# Patient Record
Sex: FEMALE | Race: WHITE | NOT HISPANIC OR LATINO | Employment: OTHER | URBAN - METROPOLITAN AREA
[De-identification: names, ages, dates, MRNs, and addresses within clinical notes are randomized per-mention and may not be internally consistent; named-entity substitution may affect disease eponyms.]

---

## 2017-02-02 ENCOUNTER — GENERIC CONVERSION - ENCOUNTER (OUTPATIENT)
Dept: OTHER | Facility: OTHER | Age: 67
End: 2017-02-02

## 2017-07-20 ENCOUNTER — ALLSCRIPTS OFFICE VISIT (OUTPATIENT)
Dept: OTHER | Facility: OTHER | Age: 67
End: 2017-07-20

## 2017-11-13 ENCOUNTER — HOSPITAL ENCOUNTER (EMERGENCY)
Facility: HOSPITAL | Age: 67
Discharge: HOME | End: 2017-11-13
Attending: EMERGENCY MEDICINE | Admitting: EMERGENCY MEDICINE
Payer: MEDICARE

## 2017-11-13 VITALS
SYSTOLIC BLOOD PRESSURE: 142 MMHG | RESPIRATION RATE: 18 BRPM | OXYGEN SATURATION: 99 % | DIASTOLIC BLOOD PRESSURE: 82 MMHG | HEART RATE: 90 BPM | HEIGHT: 64 IN | BODY MASS INDEX: 34.83 KG/M2 | WEIGHT: 204 LBS | TEMPERATURE: 98.8 F

## 2017-11-13 DIAGNOSIS — N39.0 UTI (URINARY TRACT INFECTION): Primary | ICD-10-CM

## 2017-11-13 LAB
BACTERIA UR QL AUTO: ABNORMAL /HPF
BILIRUB UR QL STRIP: NEGATIVE
CLARITY UR: ABNORMAL
COLOR UR: YELLOW
GLUCOSE SERPL-MCNC: 113 MG/DL (ref 65–140)
GLUCOSE UR STRIP-MCNC: NEGATIVE MG/DL
HGB UR QL STRIP.AUTO: ABNORMAL
KETONES UR STRIP-MCNC: NEGATIVE MG/DL
LEUKOCYTE ESTERASE UR QL STRIP: ABNORMAL
NITRITE UR QL STRIP: NEGATIVE
NON-SQ EPI CELLS URNS QL MICRO: ABNORMAL /HPF
PH UR STRIP.AUTO: 7 [PH] (ref 4.5–8)
PROT UR STRIP-MCNC: NEGATIVE MG/DL
RBC #/AREA URNS AUTO: ABNORMAL /HPF
SP GR UR STRIP.AUTO: 1.01 (ref 1–1.03)
UROBILINOGEN UR QL STRIP.AUTO: 0.2 E.U./DL
WBC #/AREA URNS AUTO: ABNORMAL /HPF

## 2017-11-13 PROCEDURE — 87077 CULTURE AEROBIC IDENTIFY: CPT | Performed by: EMERGENCY MEDICINE

## 2017-11-13 PROCEDURE — 99283 EMERGENCY DEPT VISIT LOW MDM: CPT

## 2017-11-13 PROCEDURE — 82948 REAGENT STRIP/BLOOD GLUCOSE: CPT

## 2017-11-13 PROCEDURE — 81001 URINALYSIS AUTO W/SCOPE: CPT | Performed by: EMERGENCY MEDICINE

## 2017-11-13 PROCEDURE — 87086 URINE CULTURE/COLONY COUNT: CPT | Performed by: EMERGENCY MEDICINE

## 2017-11-13 PROCEDURE — 51798 US URINE CAPACITY MEASURE: CPT

## 2017-11-13 PROCEDURE — 87186 SC STD MICRODIL/AGAR DIL: CPT | Performed by: EMERGENCY MEDICINE

## 2017-11-13 RX ORDER — PHENAZOPYRIDINE HYDROCHLORIDE 200 MG/1
TABLET, FILM COATED ORAL
Qty: 9 TABLET | Refills: 0 | Status: SHIPPED | OUTPATIENT
Start: 2017-11-13

## 2017-11-13 RX ORDER — LOSARTAN POTASSIUM 100 MG/1
TABLET ORAL
COMMUNITY

## 2017-11-13 RX ORDER — DULOXETIN HYDROCHLORIDE 60 MG/1
CAPSULE, DELAYED RELEASE ORAL
COMMUNITY

## 2017-11-13 RX ORDER — MODAFINIL 200 MG/1
TABLET ORAL
COMMUNITY

## 2017-11-13 RX ORDER — CIPROFLOXACIN 500 MG/1
500 TABLET, FILM COATED ORAL ONCE
Status: COMPLETED | OUTPATIENT
Start: 2017-11-13 | End: 2017-11-13

## 2017-11-13 RX ORDER — AZELASTINE HYDROCHLORIDE, FLUTICASONE PROPIONATE 137; 50 UG/1; UG/1
SPRAY, METERED NASAL
COMMUNITY
End: 2019-12-23 | Stop reason: SDUPTHER

## 2017-11-13 RX ORDER — MELOXICAM 7.5 MG/1
TABLET ORAL
COMMUNITY

## 2017-11-13 RX ORDER — CIPROFLOXACIN 500 MG/1
500 TABLET, FILM COATED ORAL 2 TIMES DAILY
Qty: 20 TABLET | Refills: 0 | Status: SHIPPED | OUTPATIENT
Start: 2017-11-13 | End: 2017-11-23

## 2017-11-13 RX ORDER — DIPHENHYDRAMINE HCL 25 MG
CAPSULE ORAL
COMMUNITY

## 2017-11-13 RX ORDER — NYSTATIN 100000 U/G
CREAM TOPICAL
COMMUNITY

## 2017-11-13 RX ORDER — LEVOTHYROXINE SODIUM 0.05 MG/1
TABLET ORAL
COMMUNITY

## 2017-11-13 RX ORDER — ALPRAZOLAM 0.25 MG/1
TABLET ORAL
COMMUNITY
End: 2018-02-01 | Stop reason: SDUPTHER

## 2017-11-13 RX ORDER — PHENAZOPYRIDINE HYDROCHLORIDE 100 MG/1
100 TABLET, FILM COATED ORAL ONCE
Status: COMPLETED | OUTPATIENT
Start: 2017-11-13 | End: 2017-11-13

## 2017-11-13 RX ORDER — FEXOFENADINE HCL 180 MG/1
TABLET ORAL
COMMUNITY

## 2017-11-13 RX ORDER — OMEPRAZOLE 10 MG/1
20 CAPSULE, DELAYED RELEASE ORAL DAILY
COMMUNITY
End: 2019-11-12

## 2017-11-13 RX ADMIN — PHENAZOPYRIDINE HYDROCHLORIDE 100 MG: 100 TABLET ORAL at 18:10

## 2017-11-13 RX ADMIN — CIPROFLOXACIN 500 MG: 500 TABLET, FILM COATED ORAL at 18:36

## 2017-11-13 NOTE — DISCHARGE INSTRUCTIONS
Urinary Tract Infection in Women, Ambulatory Care   GENERAL INFORMATION:   A urinary tract infection (UTI)  is caused by bacteria that get inside your urinary tract  Most bacteria that enter your urinary tract are expelled when you urinate  If the bacteria stay in your urinary tract, you may get an infection  Your urinary tract includes your kidneys, ureters, bladder, and urethra  Urine is made in your kidneys, and it flows from the ureters to the bladder  Urine leaves the bladder through the urethra  A UTI is more common in your lower urinary tract, which includes your bladder and urethra  Common symptoms include the following:   · Urinating more often or waking from sleep to urinate    · Pain or burning when you urinate    · Pain or pressure in your lower abdomen     · Urine that smells bad    · Blood in your urine    · Leaking urine  Seek immediate care for the following symptoms:   · Urinating very little or not at all    · Vomiting    · Shaking chills with a fever    · Side or back pain that gets worse  Treatment for a UTI  may include medicines to treat a bacterial infection  You may also need medicines to decrease pain and burning, or decrease the urge to urinate often  Prevent a UTI:   · Urinate when you feel the urge  Do not hold your urine  Urinate as soon as you feel you have to  · Drink liquids as directed  Ask how much liquid to drink each day and which liquids are best for you  You may need to drink more fluids than usual to help flush out the bacteria  Do not drink alcohol, caffeine, and citrus juices  These can irritate your bladder and increase your symptoms  · Apply heat  on your abdomen for 20 to 30 minutes every 2 hours for as many days as directed  Heat helps decrease discomfort and pressure in your bladder  Follow up with your healthcare provider as directed:  Write down your questions so you remember to ask them during your visits     CARE AGREEMENT:   You have the right to help plan your care  Learn about your health condition and how it may be treated  Discuss treatment options with your caregivers to decide what care you want to receive  You always have the right to refuse treatment  The above information is an  only  It is not intended as medical advice for individual conditions or treatments  Talk to your doctor, nurse or pharmacist before following any medical regimen to see if it is safe and effective for you  © 2014 [address] is for End User's use only and may not be sold, redistributed or otherwise used for commercial purposes  All illustrations and images included in CareNotes® are the copyrighted property of [employer]  or Laci Bowen

## 2017-11-13 NOTE — ED PROVIDER NOTES
History  Chief Complaint   Patient presents with    Possible UTI     Pt states she has been having increased frequency with urination for the past week  Pt states it feels like she is not fully emptying her bladder  Pt also complains of burning in perineum but denies any vaginal discharge  HPI  58-year-old white female with a chief complaint of having increased frequency on urination over the past week  Patient states that she has to go frequently to the bathroom and it feels like she does not fully empty her bladder  Patient denies any burning or pain on urination  Patient denies any fever or chills  Patient does take Benadryl and also Mucinex for seasonal allergies  Patient states she had a similar problem list summer was placed on medications and felt better  Patient states she has had a complete hysterectomy including her bilateral ovaries  Patient denies any nausea or vomiting states that she is eating and drinking well  Prior to Admission Medications   Prescriptions Last Dose Informant Patient Reported? Taking?    ALPRAZolam (XANAX) 0 25 mg tablet   Yes No   Sig: Take by mouth   Azelastine-Fluticasone (DYMISTA) 137-50 MCG/ACT SUSP   Yes No   Sig: into each nostril   DULoxetine (CYMBALTA) 60 mg delayed release capsule   Yes No   Sig: Take by mouth   aspirin 81 MG tablet   Yes No   Sig: Take by mouth   diphenhydrAMINE (BENADRYL) 25 mg capsule   Yes No   Sig: Take by mouth   fexofenadine (MUCINEX ALLERGY) 180 MG tablet   Yes No   Sig: Take by mouth   levothyroxine 50 mcg tablet   Yes No   Sig: Take by mouth   losartan (COZAAR) 100 MG tablet   Yes No   Sig: Take by mouth   meloxicam (MOBIC) 7 5 mg tablet   Yes No   Sig: Take by mouth   modafinil (PROVIGIL) 200 MG tablet   Yes No   Sig: Take by mouth   nystatin (MYCOSTATIN) cream   Yes No   Sig: Nystatin [id number] UNIT/GM External Cream  APPLY AS NEEDED TO VAGINAL AREA   Refills: 0    Active   omeprazole (PriLOSEC) 10 mg delayed release capsule   Yes Yes   Sig: Take 20 mg by mouth daily      Facility-Administered Medications: None       Past Medical History:   Diagnosis Date    Anxiety     Arthritis     Depression     Disease of thyroid gland     GERD (gastroesophageal reflux disease)     Hypertension     Sleep apnea        Past Surgical History:   Procedure Laterality Date    HYSTERECTOMY         History reviewed  No pertinent family history  I have reviewed and agree with the history as documented  Social History   Substance Use Topics    Smoking status: Never Smoker    Smokeless tobacco: Never Used    Alcohol use No        Review of Systems   Constitutional: Negative for chills and fever  HENT: Negative for congestion and rhinorrhea  Eyes: Negative for discharge and visual disturbance  Respiratory: Negative for shortness of breath and wheezing  Cardiovascular: Negative for chest pain and palpitations  Gastrointestinal: Positive for abdominal pain  Negative for nausea and vomiting  Endocrine: Negative for polydipsia and polyuria  Genitourinary: Positive for difficulty urinating, dysuria, frequency and urgency  Negative for hematuria  Musculoskeletal: Negative for arthralgias, gait problem and neck stiffness  Skin: Negative for rash and wound  Neurological: Negative for dizziness and headaches  Psychiatric/Behavioral: Negative for confusion and suicidal ideas  Physical Exam  ED Triage Vitals [11/13/17 1646]   Temperature Pulse Respirations Blood Pressure SpO2   98 8 °F (37 1 °C) (!) 114 20 146/100 96 %      Temp Source Heart Rate Source Patient Position - Orthostatic VS BP Location FiO2 (%)   Oral Monitor Sitting Left arm --      Pain Score       3           Orthostatic Vital Signs  Vitals:    11/13/17 1646 11/13/17 1837   BP: 146/100 142/82   Pulse: (!) 114 90   Patient Position - Orthostatic VS: Sitting        Physical Exam   Constitutional: She is oriented to person, place, and time   She appears well-developed and well-nourished  76-year-old white female in no acute distress but appears dehydrated to me  HENT:   Head: Normocephalic and atraumatic  Positive dry mouth   Eyes: EOM are normal  Pupils are equal, round, and reactive to light  Neck: Normal range of motion  Neck supple  Cardiovascular: Regular rhythm and normal heart sounds  Exam reveals no friction rub  No murmur heard  Positive tachycardia   Pulmonary/Chest: Effort normal and breath sounds normal  No respiratory distress  She has no wheezes  She has no rales  Abdominal: Soft  Bowel sounds are normal  She exhibits no distension  There is no tenderness  There is no rebound and no guarding  Musculoskeletal: Normal range of motion  She exhibits no edema, tenderness or deformity  Neurological: She is alert and oriented to person, place, and time  No cranial nerve deficit  She exhibits normal muscle tone  Coordination normal    Skin: Skin is warm and dry  No erythema  No pallor  Psychiatric: She has a normal mood and affect  Nursing note and vitals reviewed  ED Medications  Medications   phenazopyridine (PYRIDIUM) tablet 100 mg (100 mg Oral Given 11/13/17 1810)   ciprofloxacin (CIPRO) tablet 500 mg (500 mg Oral Given 11/13/17 1836)       Diagnostic Studies  Results Reviewed     Procedure Component Value Units Date/Time    Urine Microscopic [[id number]]  (Abnormal) Collected:  11/13/17 1803    Lab Status:  Final result Specimen:  Urine from Urine, Clean Catch Updated:  11/13/17 1843     RBC, UA 2-4 (A) /hpf      WBC, UA Innumerable (A) /hpf      Epithelial Cells Occasional /hpf      Bacteria, UA Moderate (A) /hpf     Urine culture [[id number]] Collected:  11/13/17 1803    Lab Status:   In process Specimen:  Urine from Urine, Clean Catch Updated:  11/13/17 1843    UA w Reflex to Microscopic w Reflex to Culture [[id number]]  (Abnormal) Collected:  11/13/17 1803    Lab Status:  Final result Specimen:  Urine from Urine, Clean Catch Updated:  11/13/17 1814     Color, UA Yellow     Clarity, UA Cloudy     Specific Gravity, UA 1 015     pH, UA 7 0     Leukocytes, UA Large (A)     Nitrite, UA Negative     Protein, UA Negative mg/dl      Glucose, UA Negative mg/dl      Ketones, UA Negative mg/dl      Urobilinogen, UA 0 2 E U /dl      Bilirubin, UA Negative     Blood, UA Trace-Intact (A)    Fingerstick Glucose (POCT) [[id number]]  (Normal) Collected:  11/13/17 1809    Lab Status:  Final result Updated:  11/13/17 1810     POC Glucose 113 mg/dl                  No orders to display              Procedures  Procedures       Phone Contacts  ED Phone Contact    ED Course  ED Course         spoke with patient about the use of antihistamines in urinary retention  Patient is taking Benadryl and Mucinex and also a nasal spray  However I went over her urinalysis the her and it showed some large amount of leukocytes so I believe that this dysuria is mostly caused by a UTI  Patient was placed on Cipro and also pyridium  Bladder scan showed 50 cc of urine                        MDM  CritCare Time    Differential diagnosis includes:  1  Dysuria  2  Polyuria  3  UTI  4  Rule out kidney disease  5  Hyperglycemia  Disposition  Final diagnoses:   UTI (urinary tract infection)     Time reflects when diagnosis was documented in both MDM as applicable and the Disposition within this note     Time User Action Codes Description Comment    11/13/2017  6:30 PM Manisha Griffin Add [N39 0] UTI (urinary tract infection)       ED Disposition     ED Disposition Condition Comment    Discharge  Brianafurt discharge to home/self care      Condition at discharge: Good        Follow-up Information     Follow up With Specialties Details Why [address] Dunn MD Gynecologic Oncology In 1 week  [place] [id number]      Donavon Bonilla MD Urology In 1 week  [address] [place] [address]  [phone] Patient's Medications   Discharge Prescriptions    CIPROFLOXACIN (CIPRO) 500 MG TABLET    Take 1 tablet by mouth 2 (two) times a day for 10 days       Start Date: 11/13/2017End Date: 11/23/2017       Order Dose: 500 mg       Quantity: 20 tablet    Refills: 0    PHENAZOPYRIDINE (PYRIDIUM) 200 MG TABLET    Take one pill 3 x/ day x 2-3 days       Start Date: 11/13/2017End Date: --       Order Dose: --       Quantity: 9 tablet    Refills: 0     No discharge procedures on file      ED Provider  Electronically Signed by           Phani Dunlap DO  11/13/17 5066

## 2017-11-15 LAB — BACTERIA UR CULT: ABNORMAL

## 2017-11-21 ENCOUNTER — HOSPITAL ENCOUNTER (OUTPATIENT)
Dept: MAMMOGRAPHY | Facility: CLINIC | Age: 67
Discharge: HOME | End: 2017-11-21
Payer: MEDICARE

## 2017-11-21 ENCOUNTER — TRANSCRIBE ORDERS (OUTPATIENT)
Dept: LAB | Facility: CLINIC | Age: 67
End: 2017-11-21

## 2017-11-21 DIAGNOSIS — Z12.31 ENCOUNTER FOR SCREENING MAMMOGRAM FOR HIGH-RISK PATIENT: ICD-10-CM

## 2017-11-21 DIAGNOSIS — Z12.39 SCREENING BREAST EXAMINATION: Primary | ICD-10-CM

## 2017-11-21 PROCEDURE — 77063 BREAST TOMOSYNTHESIS BI: CPT

## 2017-11-21 PROCEDURE — G0202 SCR MAMMO BI INCL CAD: HCPCS

## 2018-01-13 VITALS
HEIGHT: 63 IN | WEIGHT: 211.31 LBS | HEART RATE: 113 BPM | TEMPERATURE: 98.1 F | SYSTOLIC BLOOD PRESSURE: 144 MMHG | DIASTOLIC BLOOD PRESSURE: 84 MMHG | RESPIRATION RATE: 18 BRPM | BODY MASS INDEX: 37.44 KG/M2

## 2018-02-01 ENCOUNTER — OFFICE VISIT (OUTPATIENT)
Dept: OBGYN CLINIC | Facility: MEDICAL CENTER | Age: 68
End: 2018-02-01
Payer: MEDICARE

## 2018-02-01 VITALS
WEIGHT: 216 LBS | HEIGHT: 64 IN | SYSTOLIC BLOOD PRESSURE: 126 MMHG | BODY MASS INDEX: 36.88 KG/M2 | DIASTOLIC BLOOD PRESSURE: 76 MMHG

## 2018-02-01 DIAGNOSIS — Z12.31 ENCOUNTER FOR SCREENING MAMMOGRAM FOR MALIGNANT NEOPLASM OF BREAST: ICD-10-CM

## 2018-02-01 DIAGNOSIS — Z78.0 POSTMENOPAUSAL: ICD-10-CM

## 2018-02-01 DIAGNOSIS — Z91.89 GYN EXAM FOR HIGH-RISK MEDICARE PATIENT: Primary | ICD-10-CM

## 2018-02-01 PROBLEM — J30.2 SEASONAL ALLERGIES: Status: ACTIVE | Noted: 2018-02-01

## 2018-02-01 PROBLEM — M16.11 OSTEOARTHRITIS OF RIGHT HIP: Status: ACTIVE | Noted: 2017-02-28

## 2018-02-01 PROBLEM — F41.9 ANXIETY: Status: ACTIVE | Noted: 2017-07-20

## 2018-02-01 PROBLEM — I10 HYPERTENSION: Status: ACTIVE | Noted: 2017-07-20

## 2018-02-01 PROBLEM — M19.90 ARTHRITIS: Status: ACTIVE | Noted: 2017-07-20

## 2018-02-01 PROBLEM — G47.30 SLEEP APNEA: Status: ACTIVE | Noted: 2017-07-20

## 2018-02-01 PROBLEM — E07.9 DISEASE OF THYROID GLAND: Status: ACTIVE | Noted: 2018-02-01

## 2018-02-01 PROCEDURE — G0101 CA SCREEN;PELVIC/BREAST EXAM: HCPCS | Performed by: OBSTETRICS & GYNECOLOGY

## 2018-02-01 RX ORDER — MELATONIN 10 MG
1 CAPSULE ORAL
COMMUNITY

## 2018-02-01 RX ORDER — LANOLIN ALCOHOL/MO/W.PET/CERES
CREAM (GRAM) TOPICAL
COMMUNITY

## 2018-02-01 RX ORDER — AMPICILLIN TRIHYDRATE 250 MG
CAPSULE ORAL
COMMUNITY

## 2018-02-01 RX ORDER — UBIDECARENONE 50 MG
1200 CAPSULE ORAL
COMMUNITY

## 2018-02-01 RX ORDER — CLOBETASOL PROPIONATE 0.5 MG/G
OINTMENT TOPICAL
COMMUNITY
Start: 2018-01-09

## 2018-02-01 RX ORDER — ALPRAZOLAM 0.25 MG/1
0.25 TABLET ORAL
COMMUNITY

## 2018-02-01 RX ORDER — NIACINAMIDE 500 MG
500 TABLET ORAL
COMMUNITY

## 2018-02-01 RX ORDER — MULTIVITAMIN
1 TABLET ORAL DAILY
COMMUNITY

## 2018-02-01 RX ORDER — AMOXICILLIN 500 MG
CAPSULE ORAL
COMMUNITY

## 2018-02-01 RX ORDER — SOY ISOFLAVONE 40 MG
TABLET ORAL
COMMUNITY

## 2018-02-01 RX ORDER — TRAMADOL HYDROCHLORIDE 50 MG/1
50-100 TABLET ORAL EVERY 6 HOURS
COMMUNITY
Start: 2017-03-01

## 2018-02-01 NOTE — PROGRESS NOTES
Saurabh Morton is a 75 y o  female who presents for annual exam  The patient has no complaints today  The patient is not sexually active  GYN screening history: last mammogram: was normal  The patient is not taking hormone replacement therapy  Patient denies post-menopausal vaginal bleeding    The patient wears seatbelts: yes  The patient participates in regular exercise: yes  Has the patient ever been transfused or tattooed?: no  The patient reports that there is not domestic violence in her life  Menstrual History:  OB History     No data available        No LMP recorded (lmp unknown)  Patient has had a hysterectomy  The following portions of the patient's history were reviewed and updated as appropriate: allergies, current medications, past family history, past medical history, past social history, past surgical history and problem list   Physical Exam   Constitutional: She appears well-developed and well-nourished  Genitourinary: Vagina normal    Genitourinary Comments: Absent of cervix, uterus, both tubes, both ovaries   HENT:   Head: Normocephalic  Eyes: Pupils are equal, round, and reactive to light  Neck: Normal range of motion  Cardiovascular: Normal rate, regular rhythm, normal heart sounds and intact distal pulses  Pulmonary/Chest: Effort normal    Abdominal: Soft  Musculoskeletal: Normal range of motion  Neurological: She is alert  Skin: Skin is warm  Psychiatric: Her behavior is normal    Nursing note and vitals reviewed  Objective      /76   Ht 5' 4" (1 626 m)   Wt 98 kg (216 lb)   LMP  (LMP Unknown)   Breastfeeding? No   BMI 37 08 kg/m²     Assessment/Plan  Reviewed gyn emergencies; suggested use of hydrocortisone 1 % ointment for external genitalia;  Rxs given for next screening mammogram and DEXA Scan;qianads me her relatives are my patients   Diagnoses and all orders for this visit:    GYN exam for high-risk Medicare patient    Encounter for screening mammogram for malignant neoplasm of breast  -     Mammo screening bilateral w cad; Future    Postmenopausal  -     DXA bone density spine hip and pelvis; Future    Other orders  -     calcium citrate-vitamin D (CITRACAL+D) 315-200 MG-UNIT per tablet; Take by mouth  -     Cinnamon 500 MG capsule; Take by mouth  -     clobetasol (TEMOVATE) 0 05 % ointment;   -     Omega-3 Fatty Acids (FISH OIL) 1200 MG CAPS; Take by mouth  -     Misc Natural Products (GLUCOSAMINE CHOND COMPLEX/MSM PO); Take 1 tablet by mouth  -     Multiple Vitamin (MULTI-VITAMIN DAILY) TABS; Take 1 tablet by mouth daily  -     niacinamide 500 mg tablet; Take 500 mg by mouth  -     Calcium Polycarbophil (FIBER-CAPS PO); Take 1 tablet by mouth  -     Red Yeast Rice 600 MG TABS; Take 1,200 mg by mouth  -     Sodium Chloride 3 % AERS; into each nostril  -     Soy Isoflavones 40 MG TABS; Take by mouth  -     traMADol (ULTRAM) 50 mg tablet; Take  mg by mouth every 6 (six) hours  -     Melatonin 10 MG CAPS; Take 1 tablet by mouth  -     ALPRAZolam (XANAX) 0 25 mg tablet;  Take 0 25 mg by mouth

## 2018-08-16 ENCOUNTER — TELEPHONE (OUTPATIENT)
Dept: UROLOGY | Facility: MEDICAL CENTER | Age: 68
End: 2018-08-16

## 2018-08-21 ENCOUNTER — TRANSCRIBE ORDERS (OUTPATIENT)
Dept: ADMINISTRATIVE | Facility: HOSPITAL | Age: 68
End: 2018-08-21

## 2018-08-21 DIAGNOSIS — J32.9 CHRONIC SINUSITIS, UNSPECIFIED LOCATION: Primary | ICD-10-CM

## 2018-08-28 ENCOUNTER — HOSPITAL ENCOUNTER (OUTPATIENT)
Dept: CT IMAGING | Facility: HOSPITAL | Age: 68
Discharge: HOME | End: 2018-08-28
Payer: MEDICARE

## 2018-08-28 DIAGNOSIS — J32.9 CHRONIC SINUSITIS, UNSPECIFIED LOCATION: ICD-10-CM

## 2018-08-28 PROCEDURE — 70486 CT MAXILLOFACIAL W/O DYE: CPT

## 2018-09-05 ENCOUNTER — OFFICE VISIT (OUTPATIENT)
Dept: UROLOGY | Facility: CLINIC | Age: 68
End: 2018-09-05
Payer: MEDICARE

## 2018-09-05 VITALS
WEIGHT: 214 LBS | SYSTOLIC BLOOD PRESSURE: 130 MMHG | HEIGHT: 64 IN | HEART RATE: 88 BPM | BODY MASS INDEX: 36.54 KG/M2 | DIASTOLIC BLOOD PRESSURE: 80 MMHG

## 2018-09-05 DIAGNOSIS — N39.0 RECURRENT UTI: Primary | ICD-10-CM

## 2018-09-05 LAB — POST-VOID RESIDUAL VOLUME, ML POC: 0 ML

## 2018-09-05 PROCEDURE — 99203 OFFICE O/P NEW LOW 30 MIN: CPT | Performed by: PHYSICIAN ASSISTANT

## 2018-09-05 PROCEDURE — 51798 US URINE CAPACITY MEASURE: CPT | Performed by: PHYSICIAN ASSISTANT

## 2018-09-05 RX ORDER — LOSARTAN POTASSIUM AND HYDROCHLOROTHIAZIDE 12.5; 1 MG/1; MG/1
TABLET ORAL
COMMUNITY
Start: 2018-08-23

## 2018-09-05 RX ORDER — ACETAMINOPHEN 325 MG/1
650 TABLET ORAL EVERY 6 HOURS PRN
COMMUNITY

## 2018-09-05 NOTE — PROGRESS NOTES
1  Recurrent UTI  POCT Measure PVR         Assessment and plan:       1  Recurrent urinary infections  - we reviewed today in the office that approximately 2 urinary infections over a year is not uncommon  We discussed preventative measures including proper hydration, cranberry supplementation, probiotic use, as well as control of bowel movements in attempts to avoid constipation  - patient will follow up with us in 1 year's time for re-evaluation  She is aware to contact us in the interim with any signs and symptoms of urinary infection  Should patient have recurrent urinary infections over the next year, she will likely need an ultrasound of the kidney and bladder for further evaluation  Patient verbalized understanding  All questions answered  Ramya Saravia PA-C      Chief Complaint     New patient urinary infection    History of Present Illness     Angela Sánchez is a 70 y o    female presenting today as a new patient in regards to urinary infection  Patient states that she has had urinary infections starting her 35s around the time of marriage  She states that she changed her diet with increasing water consumption and decreasing caffeinated beverages  Her urinary infections had resolved shortly thereafter  She states she has not had an issue for many years  She admits being hospitalized within the past 1 year for urinary infection that required IV hydration and antibiosis  She believes she may have had a urinary infection since that time however symptoms on away with further hydration  Patient is overall happy with her urination at baseline  She feels like she has good stream, feels empty after urination, has nocturia 0-1 time nightly  Denies any dysuria, gross hematuria, hesitancy, incontinence, suprapubic pressure, flank pain  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 on follows with GYN      Postvoid residual reveals 0 mL        Review of Systems     Review of Systems   Constitutional: Negative for activity change, appetite change, chills, diaphoresis, fatigue, fever and unexpected weight change  Respiratory: Negative for chest tightness and shortness of breath  Cardiovascular: Negative for chest pain, palpitations and leg swelling  Gastrointestinal: Negative for abdominal distention, abdominal pain, constipation, diarrhea, nausea and vomiting  Genitourinary: Negative for decreased urine volume, difficulty urinating, dysuria, enuresis, flank pain, frequency, genital sores, hematuria and urgency  Musculoskeletal: Negative for back pain, gait problem and myalgias  Skin: Negative for color change, pallor, rash and wound  Psychiatric/Behavioral: Negative for behavioral problems  The patient is not nervous/anxious  Allergies     Allergies   Allergen Reactions    Celecoxib Hives    Codeine GI Intolerance     Other reaction(s): Nausea and/or vomiting  alyssa morphine,vicodin,ultram->n franck-pa  alyssa morphine,vicodin,ultram->n franck-pa    Meperidine GI Intolerance     Other reaction(s): Nausea and/or vomiting    Other Other (See Comments)     Other reaction(s): Other (See Comments)  grass, dust, mold  grass, dust, mold    Oxycodone-Acetaminophen GI Intolerance     Other reaction(s): Nausea and/or vomiting    Prednisone     Statins Myalgia    Acetazolamide Rash    Penicillins Rash and Hives     Other reaction(s): Hives  Other reaction(s): Hives    Sulfa Antibiotics Rash     Other reaction(s): Nausea  Other reaction(s): Nausea  Other reaction(s): GI upset       Physical Exam     Physical Exam   Constitutional: She is oriented to person, place, and time  She appears well-developed and well-nourished  No distress  neon dyed hair   HENT:   Head: Normocephalic and atraumatic  Eyes: Conjunctivae are normal    Neck: Normal range of motion  No tracheal deviation present     Pulmonary/Chest: Effort normal    Abdominal:   overweight   Musculoskeletal: Normal range of motion  She exhibits no edema or deformity  Neurological: She is alert and oriented to person, place, and time  Skin: Skin is warm and dry  She is not diaphoretic  No erythema  No pallor  Psychiatric: She has a normal mood and affect   Her behavior is normal          Vital Signs     Vitals:    09/05/18 1313   BP: 130/80   BP Location: Left arm   Patient Position: Sitting   Cuff Size: Adult   Pulse: 88   Weight: 97 1 kg (214 lb)   Height: 5' 4" (1 626 m)         Current Medications       Current Outpatient Prescriptions:     acetaminophen (TYLENOL) 325 mg tablet, Take 650 mg by mouth every 6 (six) hours as needed for mild pain, Disp: , Rfl:     ALPRAZolam (XANAX) 0 25 mg tablet, Take 0 25 mg by mouth, Disp: , Rfl:     aspirin 81 MG tablet, Take by mouth, Disp: , Rfl:     Azelastine-Fluticasone (DYMISTA) 137-50 MCG/ACT SUSP, into each nostril, Disp: , Rfl:     calcium citrate-vitamin D (CITRACAL+D) 315-200 MG-UNIT per tablet, Take by mouth, Disp: , Rfl:     Calcium Polycarbophil (FIBER-CAPS PO), Take 1 tablet by mouth, Disp: , Rfl:     Cinnamon 500 MG capsule, Take by mouth, Disp: , Rfl:     clobetasol (TEMOVATE) 0 05 % ointment, , Disp: , Rfl:     diphenhydrAMINE (BENADRYL) 25 mg capsule, Take by mouth, Disp: , Rfl:     DULoxetine (CYMBALTA) 60 mg delayed release capsule, Take by mouth, Disp: , Rfl:     fexofenadine (MUCINEX ALLERGY) 180 MG tablet, Take by mouth, Disp: , Rfl:     levothyroxine 50 mcg tablet, Take by mouth, Disp: , Rfl:     losartan (COZAAR) 100 MG tablet, Take by mouth, Disp: , Rfl:     losartan-hydrochlorothiazide (HYZAAR) 100-12 5 MG per tablet, , Disp: , Rfl:     meloxicam (MOBIC) 7 5 mg tablet, Take by mouth, Disp: , Rfl:     Misc Natural Products (GLUCOSAMINE CHOND COMPLEX/MSM PO), Take 1 tablet by mouth, Disp: , Rfl:     modafinil (PROVIGIL) 200 MG tablet, Take by mouth, Disp: , Rfl:     Multiple Vitamin (MULTI-VITAMIN DAILY) TABS, Take 1 tablet by mouth daily, Disp: , Rfl:     Omega-3 Fatty Acids (FISH OIL) 1200 MG CAPS, Take by mouth, Disp: , Rfl:     omeprazole (PriLOSEC) 10 mg delayed release capsule, Take 20 mg by mouth daily, Disp: , Rfl:     Red Yeast Rice 600 MG TABS, Take 1,200 mg by mouth, Disp: , Rfl:     Melatonin 10 MG CAPS, Take 1 tablet by mouth, Disp: , Rfl:     niacinamide 500 mg tablet, Take 500 mg by mouth, Disp: , Rfl:     nystatin (MYCOSTATIN) cream, Nystatin [id number] UNIT/GM External Cream APPLY AS NEEDED TO VAGINAL AREA  Refills: 0  Active, Disp: , Rfl:     phenazopyridine (PYRIDIUM) 200 mg tablet, Take one pill 3 x/ day x 2-3 days (Patient not taking: Reported on 9/5/2018 ), Disp: 9 tablet, Rfl: 0    Sodium Chloride 3 % AERS, into each nostril, Disp: , Rfl:     Soy Isoflavones 40 MG TABS, Take by mouth, Disp: , Rfl:     traMADol (ULTRAM) 50 mg tablet, Take  mg by mouth every 6 (six) hours, Disp: , Rfl:       Active Problems     Patient Active Problem List   Diagnosis    Anxiety    Arthritis    Disease of thyroid gland    Hypertension    Osteoarthritis of right hip    Seasonal allergies    Sleep apnea         Past Medical History     Past Medical History:   Diagnosis Date    Anxiety     Arthritis     Depression     Disease of thyroid gland     GERD (gastroesophageal reflux disease)     Hypertension     Sleep apnea     Vulvar intraepithelial neoplasia III (GIN III)     last assessed: 7/20/17         Surgical History     Past Surgical History:   Procedure Laterality Date    HYSTERECTOMY      TUBAL LIGATION           Family History     Family History   Problem Relation Age of Onset    Breast cancer Other     Ovarian cancer Cousin          Social History     Social History       Radiology

## 2021-03-05 DIAGNOSIS — Z23 ENCOUNTER FOR IMMUNIZATION: ICD-10-CM

## 2021-08-31 ENCOUNTER — TELEPHONE (OUTPATIENT)
Dept: PSYCHIATRY | Facility: CLINIC | Age: 71
End: 2021-08-31

## 2022-12-21 ENCOUNTER — TELEPHONE (OUTPATIENT)
Dept: PSYCHIATRY | Facility: CLINIC | Age: 72
End: 2022-12-21

## 2022-12-21 NOTE — TELEPHONE ENCOUNTER
Contacted patient off of non referral waitlist to verify needs of services in attempts to update list  lvm for patient to contact intake dept in regards to wait list

## 2023-06-05 ENCOUNTER — TELEPHONE (OUTPATIENT)
Dept: UROLOGY | Facility: AMBULATORY SURGERY CENTER | Age: 73
End: 2023-06-05

## 2023-06-05 ENCOUNTER — HOSPITAL ENCOUNTER (EMERGENCY)
Facility: HOSPITAL | Age: 73
Discharge: HOME | End: 2023-06-05
Attending: EMERGENCY MEDICINE | Admitting: EMERGENCY MEDICINE
Payer: MEDICARE

## 2023-06-05 VITALS
DIASTOLIC BLOOD PRESSURE: 76 MMHG | RESPIRATION RATE: 17 BRPM | TEMPERATURE: 98.5 F | OXYGEN SATURATION: 96 % | SYSTOLIC BLOOD PRESSURE: 154 MMHG | HEART RATE: 91 BPM

## 2023-06-05 DIAGNOSIS — R39.15 URINARY URGENCY: Primary | ICD-10-CM

## 2023-06-05 DIAGNOSIS — R35.0 URINARY FREQUENCY: ICD-10-CM

## 2023-06-05 LAB
BACTERIA UR QL AUTO: NORMAL /HPF
BILIRUB UR QL STRIP: NEGATIVE
CLARITY UR: CLEAR
COLOR UR: COLORLESS
GLUCOSE UR STRIP-MCNC: NEGATIVE MG/DL
HGB UR QL STRIP.AUTO: NEGATIVE
KETONES UR STRIP-MCNC: NEGATIVE MG/DL
LEUKOCYTE ESTERASE UR QL STRIP: ABNORMAL
NITRITE UR QL STRIP: NEGATIVE
NON-SQ EPI CELLS URNS QL MICRO: NORMAL /HPF
PH UR STRIP.AUTO: 6.5 [PH]
PROT UR STRIP-MCNC: NEGATIVE MG/DL
RBC #/AREA URNS AUTO: NORMAL /HPF
SP GR UR STRIP.AUTO: 1.01 (ref 1–1.03)
UROBILINOGEN UR STRIP-ACNC: <2 MG/DL
WBC #/AREA URNS AUTO: NORMAL /HPF

## 2023-06-05 PROCEDURE — 99283 EMERGENCY DEPT VISIT LOW MDM: CPT

## 2023-06-05 PROCEDURE — 81001 URINALYSIS AUTO W/SCOPE: CPT | Performed by: PHYSICIAN ASSISTANT

## 2023-06-05 RX ORDER — PHENAZOPYRIDINE HYDROCHLORIDE 100 MG/1
200 TABLET, FILM COATED ORAL ONCE
Status: COMPLETED | OUTPATIENT
Start: 2023-06-05 | End: 2023-06-05

## 2023-06-05 RX ORDER — PHENAZOPYRIDINE HYDROCHLORIDE 200 MG/1
200 TABLET, FILM COATED ORAL 3 TIMES DAILY
Qty: 6 TABLET | Refills: 0 | Status: SHIPPED | OUTPATIENT
Start: 2023-06-05

## 2023-06-05 RX ADMIN — PHENAZOPYRIDINE 200 MG: 100 TABLET ORAL at 01:36

## 2023-06-05 NOTE — TELEPHONE ENCOUNTER
New Patient    What is the reason for the patient’s appointment?: ER visit today   R39 15 (ICD-10-CM) - Urinary urgency   R35 0 (ICD-10-CM) - Urinary frequency   She states that she feels nausea sometimes  She is light headed and dizzy and it has been going on for about a week  She said she feels miserable  She said her lab work came back negative  She stated that she did have burning when urinating  No pain when urinating       What office location does the patient prefer?: [facility]     Does patient have Imaging/Lab Results: Labs    Have patient records been requested?:  no  If No, are the records showing in Epic: epic      INSURANCE:  Do we accept the patient's insurance or is the patient Self-Pay?: yes    Insurance Provider: Medicare   Plan Type/Number:  Member ID#:       HISTORY:   Has the patient had any previous Urologist(s)?: 2018 [facility] [employer]    Was the patient seen in the ED?: yes    Has the patient had any outside testing done?: no    Does the patient have a personal history of cancer?: no    Pt can be reached at [phone]

## 2023-06-05 NOTE — DISCHARGE INSTRUCTIONS
Call for follow-up with a urologist as instructed  Please return to the ED for any new or worsening of symptoms  Take prescribed medication as instructed

## 2023-06-05 NOTE — ED PROVIDER NOTES
History  Chief Complaint   Patient presents with   • Urinary Urgency     Pt arrived ambulatory with c/o urinary frequency and feeling like she is not emptying her bladder all the way     Patient is a 75years old female who presents to the ED for evaluation of urinary frequency with a feeling of incomplete bladder emptying that began yesterday morning  Admits symptoms appears to improve immediately upon urination with symptoms recur or shortly afterwards  Denies any abdominal pain, fever, chills or any other complaint on review of systems  History provided by:  Patient   used: No        Prior to Admission Medications   Prescriptions Last Dose Informant Patient Reported? Taking?    ALPRAZolam (XANAX) 0 25 mg tablet  Self Yes No   Sig: Take 0 25 mg by mouth   Azelastine-Fluticasone (Dymista) 137-50 MCG/ACT SUSP   No No   Si spray into each nostril 2 (two) times a day   Black Cohosh 80 MG CAPS   Yes No   Sig: Take by mouth   Calcium Polycarbophil (FIBER-CAPS PO)  Self Yes No   Sig: Take 1 tablet by mouth   Cinnamon 500 MG capsule  Self Yes No   Sig: Take by mouth   DULoxetine (CYMBALTA) 60 mg delayed release capsule  Self Yes No   Sig: Take by mouth   GNP CRANBERRY PO   Yes No   Sig: Take by mouth   Sara, Zingiber officinalis, (SARA EXTRACT PO)   Yes No   Sig: Take by mouth   Melatonin 10 MG CAPS  Self Yes No   Sig: Take 1 tablet by mouth   Misc Natural Products (GLUCOSAMINE CHOND COMPLEX/MSM PO)  Self Yes No   Sig: Take 1 tablet by mouth   Multiple Vitamin (MULTI-VITAMIN DAILY) TABS  Self Yes No   Sig: Take 1 tablet by mouth daily   Omega-3 Fatty Acids (FISH OIL) 1200 MG CAPS  Self Yes No   Sig: Take by mouth   Red Yeast Rice 600 MG TABS  Self Yes No   Sig: Take 1,200 mg by mouth   Sodium Chloride 3 % AERS  Self Yes No   Sig: into each nostril   Soy Isoflavones 40 MG TABS  Self Yes No   Sig: Take by mouth   acetaminophen (TYLENOL) 325 mg tablet   Yes No   Sig: Take 650 mg by mouth every 6 (six) hours as needed for mild pain   aspirin 81 MG tablet  Self Yes No   Sig: Take by mouth   butalbital-acetaminophen-caffeine (Bac) -40 mg per tablet   No No   Sig: Take 1 tablet by mouth every 4 (four) hours as needed for headaches   calcium citrate-vitamin D (CITRACAL+D) 315-200 MG-UNIT per tablet  Self Yes No   Sig: Take by mouth   cholecalciferol (VITAMIN D3) 400 units tablet   Yes No   Sig: Take 400 Units by mouth daily   clobetasol (TEMOVATE) 0 05 % ointment  Self Yes No   diphenhydrAMINE (BENADRYL) 25 mg capsule  Self Yes No   Sig: Take by mouth   famotidine (PEPCID) 40 MG tablet   No No   Sig: Take 1 tablet (40 mg total) by mouth daily at bedtime   Patient not taking: Reported on 7/21/2020   fexofenadine (MUCINEX ALLERGY) 180 MG tablet  Self Yes No   Sig: Take by mouth   levothyroxine 50 mcg tablet  Self Yes No   Sig: Take by mouth   losartan (COZAAR) 100 MG tablet  Self Yes No   Sig: Take by mouth   losartan-hydrochlorothiazide (HYZAAR) 100-12 5 MG per tablet   Yes No   meloxicam (MOBIC) 7 5 mg tablet  Self Yes No   Sig: Take by mouth   modafinil (PROVIGIL) 200 MG tablet  Self Yes No   Sig: Take by mouth   niacinamide 500 mg tablet  Self Yes No   Sig: Take 500 mg by mouth   nystatin (MYCOSTATIN) cream  Self Yes No   Sig: Nystatin 999622 UNIT/GM External Cream  APPLY AS NEEDED TO VAGINAL AREA   Refills: 0    Active   omeprazole (PriLOSEC) 20 mg delayed release capsule   Yes No   Sig: Take 20 mg by mouth daily   pantoprazole (PROTONIX) 40 mg tablet   No No   Sig: Take 1 tablet (40 mg total) by mouth daily 30 min or more prior to eating/drinking in the morning     Patient not taking: Reported on 7/21/2020   phenazopyridine (PYRIDIUM) 200 mg tablet  Self No No   Sig: Take one pill 3 x/ day x 2-3 days   Patient not taking: Reported on 9/5/2018    predniSONE 10 mg tablet   No No   Sig: Take 1 tablet (10 mg total) by mouth daily 40 mg daily x 4 days, 30 mg daily x 4 days, 20 mg daily x 4 days, 10 mg daily x 4 days   pseudoephedrine-guaifenesin (MUCINEX D)  MG per tablet   Yes No   Sig: Take 1 tablet by mouth every 12 (twelve) hours   traMADol (ULTRAM) 50 mg tablet  Self Yes No   Sig: Take  mg by mouth every 6 (six) hours      Facility-Administered Medications: None       Past Medical History:   Diagnosis Date   • Anxiety    • Arthritis    • Depression    • Disease of thyroid gland    • GERD (gastroesophageal reflux disease)    • Hypertension    • Sleep apnea    • Vulvar intraepithelial neoplasia III (GIN III)     last assessed: 7/20/17       Past Surgical History:   Procedure Laterality Date   • HYSTERECTOMY     • TUBAL LIGATION         Family History   Problem Relation Age of Onset   • Breast cancer Other    • Ovarian cancer Cousin      I have reviewed and agree with the history as documented  E-Cigarette/Vaping     E-Cigarette/Vaping Substances     Social History     Tobacco Use   • Smoking status: Never   • Smokeless tobacco: Never   Substance Use Topics   • Alcohol use: No   • Drug use: No       Review of Systems   Constitutional: Negative for chills and fever  HENT: Negative for ear pain and sore throat  Eyes: Negative for pain and visual disturbance  Respiratory: Negative for cough and shortness of breath  Cardiovascular: Negative for chest pain and palpitations  Gastrointestinal: Negative for abdominal pain and vomiting  Genitourinary: Positive for frequency and urgency  Negative for dysuria and hematuria  Musculoskeletal: Negative for arthralgias and back pain  Skin: Negative for color change and rash  Neurological: Negative for seizures and syncope  All other systems reviewed and are negative  Physical Exam  Physical Exam  Vitals and nursing note reviewed  Constitutional:       General: She is not in acute distress  Appearance: She is well-developed  HENT:      Head: Normocephalic and atraumatic     Eyes:      Conjunctiva/sclera: Conjunctivae normal  Cardiovascular:      Rate and Rhythm: Normal rate and regular rhythm  Heart sounds: No murmur heard  Pulmonary:      Effort: Pulmonary effort is normal  No respiratory distress  Breath sounds: Normal breath sounds  Abdominal:      General: Bowel sounds are normal       Palpations: Abdomen is soft  Tenderness: There is no abdominal tenderness  Musculoskeletal:         General: No swelling  Cervical back: Neck supple  Skin:     General: Skin is warm and dry  Capillary Refill: Capillary refill takes less than 2 seconds  Neurological:      Mental Status: She is alert     Psychiatric:         Mood and Affect: Mood normal          Vital Signs  ED Triage Vitals [06/05/23 0019]   Temperature Pulse Respirations Blood Pressure SpO2   98 5 °F (36 9 °C) (!) 110 20 (!) 172/87 96 %      Temp Source Heart Rate Source Patient Position - Orthostatic VS BP Location FiO2 (%)   Oral Monitor Sitting Right arm --      Pain Score       --           Vitals:    06/05/23 0019 06/05/23 0030 06/05/23 0216   BP: (!) 172/87 154/76    Pulse: (!) 110 (!) 111 91   Patient Position - Orthostatic VS: Sitting Lying Lying         Visual Acuity      ED Medications  Medications   phenazopyridine (PYRIDIUM) tablet 200 mg (200 mg Oral Given 6/5/23 0136)       Diagnostic Studies  Results Reviewed     Procedure Component Value Units Date/Time    Urine Microscopic [[id number]]  (Normal) Collected: 06/05/23 0100    Lab Status: Final result Specimen: Urine, Clean Catch Updated: 06/05/23 0116     RBC, UA 1-2 /hpf      WBC, UA 1-2 /hpf      Epithelial Cells Occasional /hpf      Bacteria, UA None Seen /hpf     UA (URINE) with reflex to Scope [[id number]]  (Abnormal) Collected: 06/05/23 0100    Lab Status: Final result Specimen: Urine, Clean Catch Updated: 06/05/23 0115     Color, UA Colorless     Clarity, UA Clear     Specific Gravity, UA 1 008     pH, UA 6 5     Leukocytes, UA Trace     Nitrite, UA Negative     Protein, UA Negative mg/dl      Glucose, UA Negative mg/dl      Ketones, UA Negative mg/dl      Urobilinogen, UA <2 0 mg/dl      Bilirubin, UA Negative     Occult Blood, UA Negative                 No orders to display              Procedures  Procedures         ED Course  ED Course as of 06/05/23 0515   Mon Jun 05, 2023   0156 Urinalysis came back notable for no acute findings  Bladder scan was notable for no bladder urinary retention         SBIRT 20yo+    Flowsheet Row Most Recent Value   Initial Alcohol Screen: US AUDIT-C     1  How often do you have a drink containing alcohol? 0 Filed at: 06/05/2023 0019   2  How many drinks containing alcohol do you have on a typical day you are drinking? 0 Filed at: 06/05/2023 0019   3b  FEMALE Any Age, or MALE 65+: How often do you have 4 or more drinks on one occassion? 0 Filed at: 06/05/2023 0019   Audit-C Score 0 Filed at: 06/05/2023 0327   KELLEY: How many times in the past year have you    Used an illegal drug or used a prescription medication for non-medical reasons? Never Filed at: 06/05/2023 0019                    Medical Decision Making  History and exam findings concerning for UTI versus cystitis  Patient was given 200 mg of Pyridium pending urinalysis  Urinalysis came back notable for no acute findings  Patient reevaluated and informed of the negative urinalysis with instructions to follow-up with urologist/primary care physician  Instructed to return to the ED for any new or worsening of symptoms  Patient was discharged home with a prescription for Pyridium  Amount and/or Complexity of Data Reviewed  Labs: ordered  Details: Urinalysis      Risk  Prescription drug management            Disposition  Final diagnoses:   Urinary urgency   Urinary frequency     Time reflects when diagnosis was documented in both MDM as applicable and the Disposition within this note     Time User Action Codes Description Comment    6/5/2023  2:08 AM Jaja Uriostegui Add [R39 15] Urinary urgency     6/5/2023  2:08 AM Jaja Uriostegui Add [R35 0] Urinary frequency       ED Disposition     ED Disposition   Discharge    Condition   Stable    Date/Time   Mon Jun 5, 2023  2:08 AM    Comment   Narendra Maguire E Elías Jhaveri discharge to home/self care  Follow-up Information     Follow up With Specialties Details Why Benjamin Garcia [id number], DO Internal Medicine Call in 1 day  SPECIAL[facility] Dylon Sneed[facility]  [phone]            Discharge Medication List as of 6/5/2023  2:10 AM      START taking these medications    Details   ! ! phenazopyridine (PYRIDIUM) 200 mg tablet Take 1 tablet (200 mg total) by mouth 3 (three) times a day, Starting Mon 6/5/2023, Normal       !! - Potential duplicate medications found  Please discuss with provider        CONTINUE these medications which have NOT CHANGED    Details   acetaminophen (TYLENOL) 325 mg tablet Take 650 mg by mouth every 6 (six) hours as needed for mild pain, Historical Med      ALPRAZolam (XANAX) 0 25 mg tablet Take 0 25 mg by mouth, Historical Med      aspirin 81 MG tablet Take by mouth, Historical Med      Azelastine-Fluticasone (Dymista) 137-50 MCG/ACT SUSP 1 spray into each nostril 2 (two) times a day, Starting Wed 1/25/2023, Normal      Black Cohosh 80 MG CAPS Take by mouth, Historical Med      butalbital-acetaminophen-caffeine (Bac) -40 mg per tablet Take 1 tablet by mouth every 4 (four) hours as needed for headaches, Starting Wed 12/22/2021, Normal      calcium citrate-vitamin D (CITRACAL+D) 315-200 MG-UNIT per tablet Take by mouth, Historical Med      Calcium Polycarbophil (FIBER-CAPS PO) Take 1 tablet by mouth, Historical Med      cholecalciferol (VITAMIN D3) 400 units tablet Take 400 Units by mouth daily, Historical Med      Cinnamon 500 MG capsule Take by mouth, Historical Med      clobetasol (TEMOVATE) 0 05 % ointment Starting Tue 1/9/2018, Historical Med      diphenhydrAMINE (BENADRYL) 25 mg capsule Take by mouth, Historical Med      DULoxetine (CYMBALTA) 60 mg delayed release capsule Take by mouth, Historical Med      famotidine (PEPCID) 40 MG tablet Take 1 tablet (40 mg total) by mouth daily at bedtime, Starting Tue 11/12/2019, Normal      fexofenadine (MUCINEX ALLERGY) 180 MG tablet Take by mouth, Historical Med      Ginger, Zingiber officinalis, (GINGER EXTRACT PO) Take by mouth, Historical Med      GNP CRANBERRY PO Take by mouth, Historical Med      levothyroxine 50 mcg tablet Take by mouth, Historical Med      losartan (COZAAR) 100 MG tablet Take by mouth, Historical Med      losartan-hydrochlorothiazide (HYZAAR) 100-12 5 MG per tablet Starting Thu 8/23/2018, Historical Med      Melatonin 10 MG CAPS Take 1 tablet by mouth, Historical Med      meloxicam (MOBIC) 7 5 mg tablet Take by mouth, Historical Med      Misc Natural Products (GLUCOSAMINE CHOND COMPLEX/MSM PO) Take 1 tablet by mouth, Historical Med      modafinil (PROVIGIL) 200 MG tablet Take by mouth, Historical Med      Multiple Vitamin (MULTI-VITAMIN DAILY) TABS Take 1 tablet by mouth daily, Historical Med      niacinamide 500 mg tablet Take 500 mg by mouth, Historical Med      nystatin (MYCOSTATIN) cream Nystatin [id number] UNIT/GM External Cream  APPLY AS NEEDED TO VAGINAL AREA   Refills: 0    Active, Historical Med      Omega-3 Fatty Acids (FISH OIL) 1200 MG CAPS Take by mouth, Historical Med      omeprazole (PriLOSEC) 20 mg delayed release capsule Take 20 mg by mouth daily, Historical Med      pantoprazole (PROTONIX) 40 mg tablet Take 1 tablet (40 mg total) by mouth daily 30 min or more prior to eating/drinking in the morning , Starting Mon 2/17/2020, Normal      !! phenazopyridine (PYRIDIUM) 200 mg tablet Take one pill 3 x/ day x 2-3 days, Print      predniSONE 10 mg tablet Take 1 tablet (10 mg total) by mouth daily 40 mg daily x 4 days, 30 mg daily x 4 days, 20 mg daily x 4 days, 10 mg daily x 4 days, Starting Mon 12/5/2022, Normal      pseudoephedrine-guaifenesin (MUCINEX D)  MG per tablet Take 1 tablet by mouth every 12 (twelve) hours, Historical Med      Red Yeast Rice 600 MG TABS Take 1,200 mg by mouth, Historical Med      Sodium Chloride 3 % AERS into each nostril, Historical Med      Soy Isoflavones 40 MG TABS Take by mouth, Historical Med      traMADol (ULTRAM) 50 mg tablet Take  mg by mouth every 6 (six) hours, Starting Wed 3/1/2017, Historical Med       !! - Potential duplicate medications found  Please discuss with provider                PDMP Review     None          ED Provider  Electronically Signed by           Naomi Lewis PA-C  06/05/23 6400

## 2023-06-06 NOTE — TELEPHONE ENCOUNTER
Spoke with pt  - offered [place] today with Denise Garcia , she does not feel well enough to travel  Advised to call her PCP and he is also far away in [place]   She will try OTC Azo for now

## 2023-06-12 ENCOUNTER — HOSPITAL ENCOUNTER (EMERGENCY)
Facility: HOSPITAL | Age: 73
Discharge: HOME | End: 2023-06-12
Attending: EMERGENCY MEDICINE | Admitting: EMERGENCY MEDICINE
Payer: MEDICARE

## 2023-06-12 VITALS
HEART RATE: 87 BPM | DIASTOLIC BLOOD PRESSURE: 85 MMHG | SYSTOLIC BLOOD PRESSURE: 135 MMHG | RESPIRATION RATE: 20 BRPM | OXYGEN SATURATION: 92 % | TEMPERATURE: 98.2 F

## 2023-06-12 DIAGNOSIS — N39.0 UTI (URINARY TRACT INFECTION): Primary | ICD-10-CM

## 2023-06-12 DIAGNOSIS — R35.0 URINARY FREQUENCY: ICD-10-CM

## 2023-06-12 LAB
BACTERIA UR QL AUTO: ABNORMAL /HPF
BILIRUB UR QL STRIP: ABNORMAL
CLARITY UR: CLEAR
COLOR UR: ABNORMAL
GLUCOSE SERPL-MCNC: 109 MG/DL (ref 65–140)
GLUCOSE UR STRIP-MCNC: NEGATIVE MG/DL
HGB UR QL STRIP.AUTO: NEGATIVE
KETONES UR STRIP-MCNC: NEGATIVE MG/DL
LEUKOCYTE ESTERASE UR QL STRIP: ABNORMAL
NITRITE UR QL STRIP: POSITIVE
NON-SQ EPI CELLS URNS QL MICRO: ABNORMAL /HPF
PH UR STRIP.AUTO: 5 [PH]
PROT UR STRIP-MCNC: NEGATIVE MG/DL
RBC #/AREA URNS AUTO: ABNORMAL /HPF
SP GR UR STRIP.AUTO: 1.01 (ref 1–1.03)
UROBILINOGEN UR STRIP-ACNC: 2 MG/DL
WBC #/AREA URNS AUTO: ABNORMAL /HPF

## 2023-06-12 PROCEDURE — 87086 URINE CULTURE/COLONY COUNT: CPT | Performed by: EMERGENCY MEDICINE

## 2023-06-12 PROCEDURE — 81001 URINALYSIS AUTO W/SCOPE: CPT | Performed by: EMERGENCY MEDICINE

## 2023-06-12 PROCEDURE — 82948 REAGENT STRIP/BLOOD GLUCOSE: CPT

## 2023-06-12 RX ORDER — PHENAZOPYRIDINE HYDROCHLORIDE 200 MG/1
200 TABLET, FILM COATED ORAL 3 TIMES DAILY
Qty: 6 TABLET | Refills: 0 | Status: SHIPPED | OUTPATIENT
Start: 2023-06-12

## 2023-06-12 RX ORDER — PHENAZOPYRIDINE HYDROCHLORIDE 200 MG/1
200 TABLET, FILM COATED ORAL 3 TIMES DAILY
Qty: 6 TABLET | Refills: 0 | Status: SHIPPED | OUTPATIENT
Start: 2023-06-12 | End: 2023-06-12 | Stop reason: SDUPTHER

## 2023-06-12 RX ORDER — CEPHALEXIN 500 MG/1
500 CAPSULE ORAL 3 TIMES DAILY
Qty: 20 CAPSULE | Refills: 0 | Status: SHIPPED | OUTPATIENT
Start: 2023-06-12 | End: 2023-06-12 | Stop reason: SDUPTHER

## 2023-06-12 RX ORDER — CEPHALEXIN 250 MG/1
500 CAPSULE ORAL ONCE
Status: COMPLETED | OUTPATIENT
Start: 2023-06-12 | End: 2023-06-12

## 2023-06-12 RX ORDER — PHENAZOPYRIDINE HYDROCHLORIDE 100 MG/1
200 TABLET, FILM COATED ORAL ONCE
Status: COMPLETED | OUTPATIENT
Start: 2023-06-12 | End: 2023-06-12

## 2023-06-12 RX ORDER — CEPHALEXIN 500 MG/1
500 CAPSULE ORAL 3 TIMES DAILY
Qty: 20 CAPSULE | Refills: 0 | Status: SHIPPED | OUTPATIENT
Start: 2023-06-12 | End: 2023-06-19

## 2023-06-12 RX ADMIN — PHENAZOPYRIDINE 200 MG: 100 TABLET ORAL at 20:14

## 2023-06-12 RX ADMIN — CEPHALEXIN 500 MG: 250 CAPSULE ORAL at 20:14

## 2023-06-13 NOTE — ED PROVIDER NOTES
History  Chief Complaint   Patient presents with   • Possible UTI     Pt reports she has bladder pressure, burning when she urinates, and feels like she can not empty completely  Urologist amira OWENS  HPI    Prior to Admission Medications   Prescriptions Last Dose Informant Patient Reported? Taking?    ALPRAZolam (XANAX) 0 25 mg tablet  Self Yes No   Sig: Take 0 25 mg by mouth   Azelastine-Fluticasone (Dymista) 137-50 MCG/ACT SUSP   No No   Si spray into each nostril 2 (two) times a day   Black Cohosh 80 MG CAPS   Yes No   Sig: Take by mouth   Calcium Polycarbophil (FIBER-CAPS PO)  Self Yes No   Sig: Take 1 tablet by mouth   Cinnamon 500 MG capsule  Self Yes No   Sig: Take by mouth   DULoxetine (CYMBALTA) 60 mg delayed release capsule  Self Yes No   Sig: Take by mouth   GNP CRANBERRY PO   Yes No   Sig: Take by mouth   Ginger, Zingiber officinalis, (GINGER EXTRACT PO)   Yes No   Sig: Take by mouth   Melatonin 10 MG CAPS  Self Yes No   Sig: Take 1 tablet by mouth   Misc Natural Products (GLUCOSAMINE CHOND COMPLEX/MSM PO)  Self Yes No   Sig: Take 1 tablet by mouth   Multiple Vitamin (MULTI-VITAMIN DAILY) TABS  Self Yes No   Sig: Take 1 tablet by mouth daily   Omega-3 Fatty Acids (FISH OIL) 1200 MG CAPS  Self Yes No   Sig: Take by mouth   Red Yeast Rice 600 MG TABS  Self Yes No   Sig: Take 1,200 mg by mouth   Sodium Chloride 3 % AERS  Self Yes No   Sig: into each nostril   Soy Isoflavones 40 MG TABS  Self Yes No   Sig: Take by mouth   acetaminophen (TYLENOL) 325 mg tablet   Yes No   Sig: Take 650 mg by mouth every 6 (six) hours as needed for mild pain   aspirin 81 MG tablet  Self Yes No   Sig: Take by mouth   butalbital-acetaminophen-caffeine (Bac) -40 mg per tablet   No No   Sig: Take 1 tablet by mouth every 4 (four) hours as needed for headaches   calcium citrate-vitamin D (CITRACAL+D) 315-200 MG-UNIT per tablet  Self Yes No   Sig: Take by mouth   cholecalciferol (VITAMIN D3) 400 units tablet   Yes No Sig: Take 400 Units by mouth daily   clobetasol (TEMOVATE) 0 05 % ointment  Self Yes No   diphenhydrAMINE (BENADRYL) 25 mg capsule  Self Yes No   Sig: Take by mouth   famotidine (PEPCID) 40 MG tablet   No No   Sig: Take 1 tablet (40 mg total) by mouth daily at bedtime   Patient not taking: Reported on 7/21/2020   fexofenadine (MUCINEX ALLERGY) 180 MG tablet  Self Yes No   Sig: Take by mouth   levothyroxine 50 mcg tablet  Self Yes No   Sig: Take by mouth   losartan (COZAAR) 100 MG tablet  Self Yes No   Sig: Take by mouth   losartan-hydrochlorothiazide (HYZAAR) 100-12 5 MG per tablet   Yes No   meloxicam (MOBIC) 7 5 mg tablet  Self Yes No   Sig: Take by mouth   modafinil (PROVIGIL) 200 MG tablet  Self Yes No   Sig: Take by mouth   niacinamide 500 mg tablet  Self Yes No   Sig: Take 500 mg by mouth   nystatin (MYCOSTATIN) cream  Self Yes No   Sig: Nystatin 770419 UNIT/GM External Cream  APPLY AS NEEDED TO VAGINAL AREA   Refills: 0    Active   omeprazole (PriLOSEC) 20 mg delayed release capsule   Yes No   Sig: Take 20 mg by mouth daily   pantoprazole (PROTONIX) 40 mg tablet   No No   Sig: Take 1 tablet (40 mg total) by mouth daily 30 min or more prior to eating/drinking in the morning     Patient not taking: Reported on 7/21/2020   phenazopyridine (PYRIDIUM) 200 mg tablet  Self No No   Sig: Take one pill 3 x/ day x 2-3 days   Patient not taking: Reported on 9/5/2018    phenazopyridine (PYRIDIUM) 200 mg tablet   No No   Sig: Take 1 tablet (200 mg total) by mouth 3 (three) times a day   predniSONE 10 mg tablet   No No   Sig: Take 1 tablet (10 mg total) by mouth daily 40 mg daily x 4 days, 30 mg daily x 4 days, 20 mg daily x 4 days, 10 mg daily x 4 days   pseudoephedrine-guaifenesin (MUCINEX D)  MG per tablet   Yes No   Sig: Take 1 tablet by mouth every 12 (twelve) hours   traMADol (ULTRAM) 50 mg tablet  Self Yes No   Sig: Take  mg by mouth every 6 (six) hours      Facility-Administered Medications: None Past Medical History:   Diagnosis Date   • Anxiety    • Arthritis    • Depression    • Disease of thyroid gland    • GERD (gastroesophageal reflux disease)    • Hypertension    • Sleep apnea    • Vulvar intraepithelial neoplasia III (GIN III)     last assessed: 7/20/17       Past Surgical History:   Procedure Laterality Date   • HYSTERECTOMY     • TUBAL LIGATION         Family History   Problem Relation Age of Onset   • Breast cancer Other    • Ovarian cancer Cousin      I have reviewed and agree with the history as documented  E-Cigarette/Vaping     E-Cigarette/Vaping Substances     Social History     Tobacco Use   • Smoking status: Never   • Smokeless tobacco: Never   Substance Use Topics   • Alcohol use: No   • Drug use: No       Review of Systems    Physical Exam  Physical Exam  Vitals and nursing note reviewed  Constitutional:       General: She is not in acute distress  Appearance: She is well-developed  HENT:      Head: Normocephalic and atraumatic  Eyes:      Conjunctiva/sclera: Conjunctivae normal       Pupils: Pupils are equal, round, and reactive to light  Neck:      Trachea: No tracheal deviation  Cardiovascular:      Rate and Rhythm: Normal rate and regular rhythm  Heart sounds: Normal heart sounds  Comments: Tachycardic in triage, normal in the room on repeat  Pulmonary:      Effort: Pulmonary effort is normal  No respiratory distress  Breath sounds: Normal breath sounds  Abdominal:      General: Bowel sounds are normal  There is no distension  Palpations: Abdomen is soft  Tenderness: There is no abdominal tenderness  There is no right CVA tenderness or left CVA tenderness  Musculoskeletal:      Cervical back: Normal range of motion  Skin:     General: Skin is warm and dry  Neurological:      Mental Status: She is alert and oriented to person, place, and time  GCS: GCS eye subscore is 4  GCS verbal subscore is 5  GCS motor subscore is 6  Psychiatric:         Behavior: Behavior normal          Vital Signs  ED Triage Vitals [06/12/23 1748]   Temperature Pulse Respirations Blood Pressure SpO2   98 2 °F (36 8 °C) (!) 120 20 135/85 94 %      Temp Source Heart Rate Source Patient Position - Orthostatic VS BP Location FiO2 (%)   Oral Monitor Sitting Left arm --      Pain Score       --           Vitals:    06/12/23 1748 06/12/23 1945   BP: 135/85    Pulse: (!) 120 87   Patient Position - Orthostatic VS: Sitting          Visual Acuity      ED Medications  Medications   cephalexin (KEFLEX) capsule 500 mg (500 mg Oral Given 6/12/23 2014)   phenazopyridine (PYRIDIUM) tablet 200 mg (200 mg Oral Given 6/12/23 2014)       Diagnostic Studies  Results Reviewed     Procedure Component Value Units Date/Time    Urine Microscopic [[id number]]  (Abnormal) Collected: 06/12/23 1913    Lab Status: Final result Specimen: Urine, Clean Catch Updated: 06/12/23 1926     RBC, UA 1-2 /hpf      WBC, UA 30-50 /hpf      Epithelial Cells Occasional /hpf      Bacteria, UA None Seen /hpf     Urine culture [[id number]] Collected: 06/12/23 1913    Lab Status:  In process Specimen: Urine, Clean Catch Updated: 06/12/23 1926    UA w Reflex to Microscopic w Reflex to Culture [[id number]]  (Abnormal) Collected: 06/12/23 1913    Lab Status: Final result Specimen: Urine, Clean Catch Updated: 06/12/23 1925     Color, UA Dark Orange     Clarity, UA Clear     Specific Gravity, UA 1 014     pH, UA 5 0     Leukocytes, UA Trace     Nitrite, UA Positive     Protein, UA Negative mg/dl      Glucose, UA Negative mg/dl      Ketones, UA Negative mg/dl      Urobilinogen, UA 2 0 mg/dl      Bilirubin, UA Small     Occult Blood, UA Negative    Fingerstick Glucose (POCT) [[id number]]  (Normal) Collected: 06/12/23 1913    Lab Status: Final result Updated: 06/12/23 1915     POC Glucose 109 mg/dl                  No orders to display              Procedures  Procedures         ED Course "                            SBIRT 20yo+    Flowsheet Row Most Recent Value   Initial Alcohol Screen: US AUDIT-C     1  How often do you have a drink containing alcohol? 0 Filed at: 06/12/2023 1914   2  How many drinks containing alcohol do you have on a typical day you are drinking? 0 Filed at: 06/12/2023 1914   3b  FEMALE Any Age, or MALE 65+: How often do you have 4 or more drinks on one occassion? 0 Filed at: 06/12/2023 1914   Audit-C Score 0 Filed at: 06/12/2023 1914   KELLEY: How many times in the past year have you    Used an illegal drug or used a prescription medication for non-medical reasons? Never Filed at: 06/12/2023 1501 [address]  This is a 69-year-old female who presents here today for evaluation of possible UTI  She says she was seen here a week ago to urinary frequency, was told she did not have a UTI, and did have mild improvement with Pyridium  After that, she was told to take Azo which she does not feel was as helpful, and did cause some nausea  She says she has continued to have urinary frequency, feeling like she is going all the time, especially when she first wake up, and frequently every 2 hours overnight  She feels like she fully empties her bladder, but then has to go again immediately thereafter  She denies any dysuria, hematuria, fevers, chills  She endorses generalized malaise and overall not feeling well  She has not had any vomiting  She denies diarrhea or changes in her bowels  She has a remote history of recurrent UTIs  She denies prior pyelonephritis or kidney stones  She says she feels like she is having \"hot flashes\" but is taking an over-the-counter supplement advised by her gynecologist   She says this was somewhat helpful in managing symptoms, however stopped about a week ago and feels like hot flash symptoms are worse  She feels like he has been excessively thirsty and has been drinking a lot lately    She has lost about 10 to 15 " "pounds over the past several months unintentionally  She has previously been told that she is \"prediabetic  \"  She did try to call to follow-up with the urologist after her last visit to the ER, however is unable to be seen until July  Urinalysis showed trace leukocytes, with 1-2 red and white blood cells with no bacteria  She had a prior urine culture from 11/13/2017 which grew out 40-49,000 E  coli, intermediate to ampicillin sulbactam, resistant to ampicillin, and otherwise susceptible  Review of systems: Otherwise negative unless stated as above    She is well-appearing, no acute distress  She was tachycardic in triage, but improved on repeat in the room  She has no abdominal or CVA tenderness  Exam is otherwise unremarkable  This may be development of UTI, new onset diabetes triggering her urinary frequency, less likely related to constipation given reported normal bowel movements  We will check urine here today, as well as fingerstick to evaluate  She is nitrite positive, with 30-50 white blood cells  Though there are no bacteria, with symptoms, we will treat this as UTI  Glucose is normal at 109  I discussed the patient findings, management at home, follow-up, and indications for return, and she expresses understanding with this plan  Amount and/or Complexity of Data Reviewed  Labs: ordered  Risk  Prescription drug management            Disposition  Final diagnoses:   UTI (urinary tract infection)   Urinary frequency     Time reflects when diagnosis was documented in both MDM as applicable and the Disposition within this note     Time User Action Codes Description Comment    6/12/2023  8:38 PM Sherif Romero Add [N39 0] UTI (urinary tract infection)     6/12/2023  8:38 PM Sherif Romero Add [R35 0] Urinary frequency       ED Disposition     ED Disposition   Discharge    Condition   Good    Date/Time   Mon Jun 12, 2023 2011    [address] " discharge to home/self care  Follow-up Information     Follow up With Specialties Details Why Contact Info    Jareth Yin, DO Internal Medicine Schedule an appointment as soon as possible for a visit  to follow up on your symptoms [address] [place] [address] [phone]            Current Discharge Medication List      START taking these medications    Details   cephalexin (KEFLEX) 500 mg capsule Take 1 capsule (500 mg total) by mouth 3 (three) times a day for 7 days  Qty: 20 capsule, Refills: 0    Associated Diagnoses: UTI (urinary tract infection)      ! ! phenazopyridine (PYRIDIUM) 200 mg tablet Take 1 tablet (200 mg total) by mouth 3 (three) times a day  Qty: 6 tablet, Refills: 0    Associated Diagnoses: UTI (urinary tract infection); Urinary frequency       ! ! - Potential duplicate medications found  Please discuss with provider        CONTINUE these medications which have NOT CHANGED    Details   acetaminophen (TYLENOL) 325 mg tablet Take 650 mg by mouth every 6 (six) hours as needed for mild pain      ALPRAZolam (XANAX) 0 25 mg tablet Take 0 25 mg by mouth      aspirin 81 MG tablet Take by mouth      Azelastine-Fluticasone (Dymista) 137-50 MCG/ACT SUSP 1 spray into each nostril 2 (two) times a day  Qty: 23 g, Refills: 5    Associated Diagnoses: Seasonal allergic rhinitis due to pollen      Black Cohosh 80 MG CAPS Take by mouth      butalbital-acetaminophen-caffeine (Bac) -40 mg per tablet Take 1 tablet by mouth every 4 (four) hours as needed for headaches  Qty: 30 tablet, Refills: 0    Associated Diagnoses: Intractable chronic migraine without aura and without status migrainosus      calcium citrate-vitamin D (CITRACAL+D) 315-200 MG-UNIT per tablet Take by mouth      Calcium Polycarbophil (FIBER-CAPS PO) Take 1 tablet by mouth      cholecalciferol (VITAMIN D3) 400 units tablet Take 400 Units by mouth daily      Cinnamon 500 MG capsule Take by mouth clobetasol (TEMOVATE) 0 05 % ointment       diphenhydrAMINE (BENADRYL) 25 mg capsule Take by mouth      DULoxetine (CYMBALTA) 60 mg delayed release capsule Take by mouth      famotidine (PEPCID) 40 MG tablet Take 1 tablet (40 mg total) by mouth daily at bedtime  Qty: 30 tablet, Refills: 3    Associated Diagnoses: Laryngopharyngeal reflux (LPR)      fexofenadine (MUCINEX ALLERGY) 180 MG tablet Take by mouth      Ginger, Zingiber officinalis, (GINGER EXTRACT PO) Take by mouth      GNP CRANBERRY PO Take by mouth      levothyroxine 50 mcg tablet Take by mouth      losartan (COZAAR) 100 MG tablet Take by mouth      losartan-hydrochlorothiazide (HYZAAR) 100-12 5 MG per tablet       Melatonin 10 MG CAPS Take 1 tablet by mouth      meloxicam (MOBIC) 7 5 mg tablet Take by mouth      Misc Natural Products (GLUCOSAMINE CHOND COMPLEX/MSM PO) Take 1 tablet by mouth      modafinil (PROVIGIL) 200 MG tablet Take by mouth      Multiple Vitamin (MULTI-VITAMIN DAILY) TABS Take 1 tablet by mouth daily      niacinamide 500 mg tablet Take 500 mg by mouth      nystatin (MYCOSTATIN) cream Nystatin 735182 UNIT/GM External Cream  APPLY AS NEEDED TO VAGINAL AREA   Refills: 0    Active      Omega-3 Fatty Acids (FISH OIL) 1200 MG CAPS Take by mouth      omeprazole (PriLOSEC) 20 mg delayed release capsule Take 20 mg by mouth daily      pantoprazole (PROTONIX) 40 mg tablet Take 1 tablet (40 mg total) by mouth daily 30 min or more prior to eating/drinking in the morning  Qty: 30 tablet, Refills: 3    Associated Diagnoses: Laryngopharyngeal reflux (LPR)      ! ! phenazopyridine (PYRIDIUM) 200 mg tablet Take one pill 3 x/ day x 2-3 days  Qty: 9 tablet, Refills: 0      !! phenazopyridine (PYRIDIUM) 200 mg tablet Take 1 tablet (200 mg total) by mouth 3 (three) times a day  Qty: 6 tablet, Refills: 0    Associated Diagnoses: Urinary urgency; Urinary frequency      predniSONE 10 mg tablet Take 1 tablet (10 mg total) by mouth daily 40 mg daily x 4 days, 30 mg daily x 4 days, 20 mg daily x 4 days, 10 mg daily x 4 days  Qty: 40 tablet, Refills: 0    Associated Diagnoses: Acute recurrent sinusitis, unspecified location      pseudoephedrine-guaifenesin (MUCINEX D)  MG per tablet Take 1 tablet by mouth every 12 (twelve) hours      Red Yeast Rice 600 MG TABS Take 1,200 mg by mouth      Sodium Chloride 3 % AERS into each nostril      Soy Isoflavones 40 MG TABS Take by mouth      traMADol (ULTRAM) 50 mg tablet Take  mg by mouth every 6 (six) hours       ! ! - Potential duplicate medications found  Please discuss with provider  No discharge procedures on file      PDMP Review     None          ED Provider  Electronically Signed by           Pepper Broderick MD  06/12/23 3300

## 2023-06-14 LAB — BACTERIA UR CULT: NORMAL

## 2024-01-12 NOTE — TELEPHONE ENCOUNTER
Reason for appointment/Complaint/Diagnosis:   Possible UTI    Insurance: Medicare and [facility]    History of Cancer? no                       If yes, what kind? Previous urologist?     No                  Records requested/where? No    Outside testing/where? Location Preference for office visit? [facility]    Appointment made for 09/05/18 at 1:15PM in [facility] with P RADHA  Intake paperwork mailed  Chart prep notified  Lul Jernigan

## 2024-05-16 ENCOUNTER — TELEPHONE (OUTPATIENT)
Dept: PSYCHIATRY | Facility: CLINIC | Age: 74
End: 2024-05-16